# Patient Record
Sex: FEMALE | Race: WHITE | URBAN - METROPOLITAN AREA
[De-identification: names, ages, dates, MRNs, and addresses within clinical notes are randomized per-mention and may not be internally consistent; named-entity substitution may affect disease eponyms.]

---

## 2018-03-01 ENCOUNTER — RADIANT APPOINTMENT (OUTPATIENT)
Dept: GENERAL RADIOLOGY | Facility: CLINIC | Age: 48
End: 2018-03-01
Attending: ORTHOPAEDIC SURGERY

## 2018-03-01 ENCOUNTER — OFFICE VISIT (OUTPATIENT)
Dept: ORTHOPEDICS | Facility: CLINIC | Age: 48
End: 2018-03-01

## 2018-03-01 ENCOUNTER — THERAPY VISIT (OUTPATIENT)
Dept: OCCUPATIONAL THERAPY | Facility: CLINIC | Age: 48
End: 2018-03-01
Payer: COMMERCIAL

## 2018-03-01 ENCOUNTER — TELEPHONE (OUTPATIENT)
Dept: ORTHOPEDICS | Facility: CLINIC | Age: 48
End: 2018-03-01

## 2018-03-01 VITALS — BODY MASS INDEX: 20.73 KG/M2 | HEIGHT: 66 IN | WEIGHT: 129 LBS

## 2018-03-01 DIAGNOSIS — M79.644 PAIN OF RIGHT THUMB: Primary | ICD-10-CM

## 2018-03-01 DIAGNOSIS — S62.502A CLOSED FRACTURE OF LEFT THUMB: ICD-10-CM

## 2018-03-01 DIAGNOSIS — S62.502A CLOSED FRACTURE OF LEFT THUMB: Primary | ICD-10-CM

## 2018-03-01 DIAGNOSIS — S62.501D CLOSED AVULSION FRACTURE OF RIGHT THUMB WITH ROUTINE HEALING, SUBSEQUENT ENCOUNTER: ICD-10-CM

## 2018-03-01 DIAGNOSIS — S62.515A CLOSED NONDISPLACED FRACTURE OF PROXIMAL PHALANX OF LEFT THUMB, INITIAL ENCOUNTER: Primary | ICD-10-CM

## 2018-03-01 PROCEDURE — 97760 ORTHOTIC MGMT&TRAING 1ST ENC: CPT | Mod: GO | Performed by: OCCUPATIONAL THERAPIST

## 2018-03-01 RX ORDER — ZOLPIDEM TARTRATE 5 MG/1
5 TABLET ORAL
COMMUNITY
Start: 2018-02-01

## 2018-03-01 RX ORDER — POLYETHYLENE GLYCOL 3350 17 G/17G
17 POWDER, FOR SOLUTION ORAL
COMMUNITY
Start: 2012-11-12

## 2018-03-01 RX ORDER — ALBUTEROL SULFATE 90 UG/1
2 AEROSOL, METERED RESPIRATORY (INHALATION)
COMMUNITY
Start: 2018-01-31

## 2018-03-01 RX ORDER — LORATADINE 10 MG/1
10 TABLET ORAL
COMMUNITY
Start: 2018-01-31

## 2018-03-01 NOTE — MR AVS SNAPSHOT
"              After Visit Summary   3/1/2018    Aishwarya Parson    MRN: [id number]           Patient Information     Date Of Birth          1970        Visit Information        Provider Department      3/1/2018 3:00 PM Alison Woodall MD [facility]        Today's Diagnoses     Closed nondisplaced fracture of proximal phalanx of left thumb, initial encounter    -  1       Follow-ups after your visit        Additional Services     HAND THERAPY       Hand Therapy Referral  Thumb based splint, with thumb in adduction. To protect the UCL ligament.                  Who to contact     Please call your clinic at [phone] to:    Ask questions about your health    Make or cancel appointments    Discuss your medicines    Learn about your test results    Speak to your doctor            Additional Information About Your Visit        MyChart Information     [employer] is an electronic gateway that provides easy, online access to your medical records. With [employer], you can request a clinic appointment, read your test results, renew a prescription or communicate with your care team.     To sign up for [employer] visit the website at [email or web address]   You will be asked to enter the access code listed below, as well as some personal information. Please follow the directions to create your username and password.     Your access code is: [email or web address]  Expires: 2018  5:02 PM     Your access code will  in 90 days. If you need help or a new code, please contact your [facility] or call [phone] for assistance.        Care EveryWhere ID     This is your Care EveryWhere ID. This could be used by other organizations to access your [place] medical records  [id number]        Your Vitals Were     Height BMI (Body Mass Index)                1.676 m (5' 6\") 20.82 kg/m2           Blood Pressure from Last 3 Encounters:   No data found for BP    Weight from Last 3 " Encounters:   03/01/18 58.5 kg (129 lb)              We Performed the Following     HAND THERAPY        Primary Care Provider    None Specified       No primary provider on file.        Equal Access to Services     JENELLE GONGORA : Hadii aad ku hademiliooskar Ruiz, lisandraankit cliftonroelha, beth albaroannie covarrubias, christian erwin enead gold labrittnykim perry. So [facility] [phone].    ATENCIÓN: Si habla español, tiene a scanlon disposición servicios gratuitos de asistencia lingüística. Llame al [phone].    We comply with applicable federal civil rights laws and [place] laws. We do not discriminate on the basis of race, color, national origin, age, disability, sex, sexual orientation, or gender identity.            Thank you!     Thank you for choosing [facility]  for your care. Our goal is always to provide you with excellent care. Hearing back from our patients is one way we can continue to improve our services. Please take a few minutes to complete the written survey that you may receive in the mail after your visit with us. Thank you!             Your Updated Medication List - Protect others around you: Learn how to safely use, store and throw away your medicines at www.disposemymeds.org.          This list is accurate as of 3/1/18 11:59 PM.  Always use your most recent med list.                   Brand Name Dispense Instructions for use Diagnosis    albuterol 108 (90 BASE) MCG/ACT Inhaler    PROAIR HFA/PROVENTIL HFA/VENTOLIN HFA     Inhale 2 puffs into the lungs        beclomethasone 40 MCG/ACT Inhaler    QVAR     Inhale 1-2 puffs into the lungs        loratadine 10 MG tablet    CLARITIN     Take 10 mg by mouth        mupirocin 2 % nasal ointment    BACTROBAN     Apply BID prn        omeprazole 20 MG CR capsule    priLOSEC     Take 20 mg by mouth        polyethylene glycol Packet    MIRALAX/GLYCOLAX     Take 17 g by mouth        zolpidem 5 MG tablet    AMBIEN     Take 5 mg by mouth

## 2018-03-01 NOTE — LETTER
Date:March 6, 2018      Patient was self referred, no letter generated. Do not send.        [facility] Information

## 2018-03-01 NOTE — LETTER
3/1/2018       RE: Aishwarya Parson  [address] [place] [address]     Dear Colleague,    Thank you for referring your patient, Aishwarya Parson, to the [facility] at [facility]. Please see a copy of my visit note below.    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  Has had pain, swelling, and tenderness.    PE:  Tenderness and echymosis at UCL.  Stable to varus and valgus stress.    Xray:  Minimally displaced UCL avulsion fracture    IMP:  Stable fx    PLAN:  Splint immobilization full time for one month.  RTC for repeat xray left thumb.      Again, thank you for allowing me to participate in the care of your patient.      Sincerely,    Alison Woodall MD

## 2018-03-01 NOTE — TELEPHONE ENCOUNTER
Spoke with Dr. Woodall's nurse at [facility] regarding getting the patient in to clinic today to see us for her Left thumb fracture.  Appointment made for today at 3pm, patient to get x-rays prior per Dr. Woodall.

## 2018-03-01 NOTE — MR AVS SNAPSHOT
"              After Visit Summary   3/1/2018    Aishwarya Parson    MRN: [id number]           Patient Information     Date Of Birth          1970        Visit Information        Provider Department      3/1/2018 3:30 PM Michelle Hendrix OT [facility]        Today's Diagnoses     Pain of right thumb    -  1    Closed avulsion fracture of right thumb with routine healing, subsequent encounter           Follow-ups after your visit        Who to contact     If you have questions or need follow up information about today's clinic visit or your schedule please contact [facility] directly at [phone].  Normal or non-critical lab and imaging results will be communicated to you by "[employer]hart, letter or phone within 4 business days after the clinic has received the results. If you do not hear from us within 7 days, please contact the clinic through "[employer]hart or phone. If you have a critical or abnormal lab result, we will notify you by phone as soon as possible.  Submit refill requests through [employer] or call your pharmacy and they will forward the refill request to us. Please allow 3 business days for your refill to be completed.          Additional Information About Your Visit        MyChart Information     [employer] lets you send messages to your doctor, view your test results, renew your prescriptions, schedule appointments and more. To sign up, go to [email or web address] . Click on \"Log in\" on the left side of the screen, which will take you to the Welcome page. Then click on \"Sign up Now\" on the right side of the page.     You will be asked to enter the access code listed below, as well as some personal information. Please follow the directions to create your username and password.     Your access code is: [email or web address]  Expires: 2018  5:02 PM     Your access code will  in 90 days. If you need help or a new code, please call your [place] clinic or [phone].        Care EveryWhere " ID     This is your Care EveryWhere ID. This could be used by other organizations to access your [place] medical records  [id number]         Blood Pressure from Last 3 Encounters:   No data found for BP    Weight from Last 3 Encounters:   03/01/18 58.5 kg (129 lb)              We Performed the Following     ORTHOTIC MGMT AND TRAINING, EACH 15 MIN        Primary Care Provider    None Specified       No primary provider on file.        Equal Access to Services     [facility]: Hadii aad ku hadasho Soomaali, waaxda luqadaha, qaybta kaalmada adeegyada, waxay jpin hayaan adead jessiemax kuhn . So [facility] [phone].    ATENCIÓN: Si habla español, tiene a scanlon disposición servicios gratuitos de asistencia lingüística. Llame al [phone].    We comply with applicable federal civil rights laws and [place] laws. We do not discriminate on the basis of race, color, national origin, age, disability, sex, sexual orientation, or gender identity.            Thank you!     Thank you for choosing [facility]  for your care. Our goal is always to provide you with excellent care. Hearing back from our patients is one way we can continue to improve our services. Please take a few minutes to complete the written survey that you may receive in the mail after your visit with us. Thank you!             Your Updated Medication List - Protect others around you: Learn how to safely use, store and throw away your medicines at www.disposemymeds.org.          This list is accurate as of 3/1/18  5:02 PM.  Always use your most recent med list.                   Brand Name Dispense Instructions for use Diagnosis    albuterol 108 (90 BASE) MCG/ACT Inhaler    PROAIR HFA/PROVENTIL HFA/VENTOLIN HFA     Inhale 2 puffs into the lungs        beclomethasone 40 MCG/ACT Inhaler    QVAR     Inhale 1-2 puffs into the lungs        loratadine 10 MG tablet    CLARITIN     Take 10 mg by mouth        mupirocin 2 % nasal ointment    BACTROBAN      Apply BID prn        omeprazole 20 MG CR capsule    priLOSEC     Take 20 mg by mouth        polyethylene glycol Packet    MIRALAX/GLYCOLAX     Take 17 g by mouth        zolpidem 5 MG tablet    AMBIEN     Take 5 mg by mouth

## 2018-03-01 NOTE — PROGRESS NOTES
Hand Therapy Initial Evaluation    Current Date:  3/1/2018    Diagnosis:   right thumb UCL avulsion fx   DOI:  2/27/18    Post:  0w 2d  Referring MD:Alison Woodall MD   Next MD visit: PRN will see on rounds at another hospital    Initial Subjective:  Aishwarya Parson is a 47 year old right hand dominant female.    Patient reports symptoms of pain, stiffness/loss of motion, weakness/loss of strength, edema and structure at risk of injury of the right thumb which occurred due to skiing accident. Since onset symptoms are Gradually getting better..  Special tests:  x-ray.  Previous treatment: exos orthosis.    General health as reported by patient is excellent.  Pertinent medical history includes:none  Medical allergies:none.   Surgical history: none  Medication history: anti-inflammatory.    Occupational Profile Information:  Current occupation is Physician [employer]  Currently working in normal job with restrictions  Job Tasks: prolonged sitting, prolonged standing, lifting/carrying, pushing/pulling, repetitive tasks, computer work  Prior functional level:  no limitations  Barriers include:none  Mobility: No difficulty  Transportation: drives  Leisure activities/hobbies: skiing, active person  Other:     Functional Outcome Measure:   See flowsheet    Objective:    PAIN 3/1/2018     Location Right  thumb     Description tender       Radiates no     Worse d/n daytime     Frequency activity dependant     Exacerbated by movement     Relieves rest     At rest 0-10/10 0/10     On use 0-10/10 1/10     At worst.0-10/10 2/10     Sleep disruption?   no     Progression Gradually getting better.           ROM:  NONE measured due to protection of injury  Edema: mild of affected part  Sensation: [x]       WNL throughout all nerve distributions; per patient report    []       Decreased in []        Median   []        Ulnar  []       Radial nerve  []        Dorsal Radial Sensory Nerve distributions; per patient report        Assessment:    Patient presents with symptoms consistent with above diagnosis,  with non-surgical intervention.     Patient's limitations or Problem List includes:  Pain, Decreased ROM/motion, Increased edema and structure at risk of injury of the right thumb which interferes with the patient's ability to perform Self Care Tasks (dressing, eating, bathing, hygiene/toileting), Work Tasks, Recreational Activities and Household Chores as compared to previous level of function.    Rehab Potential:  Excellent - Return to full activity, no limitations    Patient will benefit from skilled Occupational Therapy to increase ROM, flexibility, motion and stability of thumb and decrease pain and edema to return to previous activity level and resume normal daily tasks and to reach their rehab potential.    Barriers to Learning:  No barrier    Communication Issues:  Patient appears to be able to clearly communicate and understand verbal and written communication and follow directions correctly.      Chart Review: Chart Review, Brief history including review of medical and/or therapy records relating to the presenting problem and Simple history review with patient    Identified Performance Deficits: bathing/showering, feeding, care of others, home establishment and management, meal preparation and cleanup, work and leisure activities    Assessment of Occupational Performance:  5 or more Performance Deficits    Clinical Decision Making (Complexity): Low complexity      Treatment Explanation:  The following has been discussed with the patient:  RX ordered/plan of care  Anticipated outcomes  Possible risks and side effects        Treatment Plan:   Frequency:  1 x visit  Duration:  NA; 1 x visit       Orthotic Fabrication: Static hand based thumb spica orthosis, IP included      Discharge Plan:  Achieve all LTG.  Independent in home treatment program.  Reach maximal therapeutic benefit.    Home Exercise Program:  Wear full time and follow up with  Dr. Woodall for further orders for motion at 4 to 6 weeks

## 2018-03-05 NOTE — PROGRESS NOTES
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  Has had pain, swelling, and tenderness.    PE:  Tenderness and echymosis at UCL.  Stable to varus and valgus stress.    Xray:  Minimally displaced UCL avulsion fracture    IMP:  Stable fx    PLAN:  Splint immobilization full time for one month.  RTC for repeat xray left thumb.

## 2018-03-23 ENCOUNTER — TELEPHONE (OUTPATIENT)
Dept: ORTHOPEDICS | Facility: CLINIC | Age: 48
End: 2018-03-23

## 2018-03-23 NOTE — TELEPHONE ENCOUNTER
Returned call to patient regarding scheduling a follow up appointment with Dr. Woodall for next Thursday. Patient is s/p Left thumb minimally displaced UCL avulsion fracture. Per Dr. Woodall's clinic dictation on 3/1/18 patient is to follow up in 1 month in clinic. Appointment made for next Thursday 3/29/18 at 8:00am. Patient agreeable with appointment date and time.

## 2018-03-26 DIAGNOSIS — S62.502A: Primary | ICD-10-CM

## 2018-03-29 ENCOUNTER — RADIANT APPOINTMENT (OUTPATIENT)
Dept: GENERAL RADIOLOGY | Facility: CLINIC | Age: 48
End: 2018-03-29
Attending: ORTHOPAEDIC SURGERY

## 2018-03-29 ENCOUNTER — OFFICE VISIT (OUTPATIENT)
Dept: ORTHOPEDICS | Facility: CLINIC | Age: 48
End: 2018-03-29

## 2018-03-29 DIAGNOSIS — S62.502A: ICD-10-CM

## 2018-03-29 DIAGNOSIS — S62.515D CLOSED NONDISPLACED FRACTURE OF PROXIMAL PHALANX OF LEFT THUMB WITH ROUTINE HEALING, SUBSEQUENT ENCOUNTER: Primary | ICD-10-CM

## 2018-03-29 NOTE — NURSING NOTE
Chief Complaint   Patient presents with     RECHECK     1 month F/u Left Thumb Avulsion FX with new XR.        47 year old  1970         Pain Assessment  Patient Currently in Pain: Yes  0-10 Pain Scale: 1  Alleviating Factors:  (Brace)  Aggravating Factors:  (Not wearing the brace)          [id number] - [place], [place] - [address] [place] [address] [address] [facility]    No Known Allergies  Current Outpatient Prescriptions   Medication     albuterol (PROAIR HFA/PROVENTIL HFA/VENTOLIN HFA) 108 (90 BASE) MCG/ACT Inhaler     beclomethasone (QVAR) 40 MCG/ACT Inhaler     loratadine (CLARITIN) 10 MG tablet     mupirocin (BACTROBAN) 2 % nasal ointment     polyethylene glycol (MIRALAX/GLYCOLAX) Packet     omeprazole (PRILOSEC) 20 MG CR capsule     zolpidem (AMBIEN) 5 MG tablet     No current facility-administered medications for this visit.

## 2018-03-29 NOTE — LETTER
3/29/2018       RE: Aishwarya Parson  [address] [place] [address]     Dear Colleague,    Thank you for referring your patient, Aishwarya Parson, to the [facility] at [facility]. Please see a copy of my visit note below.    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  She was seen with x-rays taken and I had treated her with a full-time thumb spica hand-based brace.  She has been compliant.  She reports it is feeling better but still somewhat painful.      PHYSICAL EXAMINATION:  On examination today, her brace has been removed. She is nontender at the fracture site.  She is tender with range of motion of the thumb as expected, particularly the volar plate when brought into extension.  Distal sensory motor circulation is full.  She is stiff as expected.      IMAGING:  X-rays are taken today and show a healed ulnar collateral ligament avulsion fracture of the right thumb proximal phalanx.      IMPRESSION:  Healed fracture.      At this time, we will advance her to wearing her brace for protection only.  She was given putty to start with strengthening.  Her pinch strength on her affected side is 5 pounds and her unaffected side is 15 pounds.  She will be working on pinch strength and I will see her on an informal basis after today.     Sincerely,    Alison Woodall MD

## 2018-03-29 NOTE — PROGRESS NOTES
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  She was seen with x-rays taken and I had treated her with a full-time thumb spica hand-based brace.  She has been compliant.  She reports it is feeling better but still somewhat painful.      PHYSICAL EXAMINATION:  On examination today, her brace has been removed. She is nontender at the fracture site.  She is tender with range of motion of the thumb as expected, particularly the volar plate when brought into extension.  Distal sensory motor circulation is full.  She is stiff as expected.      IMAGING:  X-rays are taken today and show a healed ulnar collateral ligament avulsion fracture of the right thumb proximal phalanx.      IMPRESSION:  Healed fracture.      At this time, we will advance her to wearing her brace for protection only.  She was given putty to start with strengthening.  Her pinch strength on her affected side is 5 pounds and her unaffected side is 15 pounds.  She will be working on pinch strength and I will see her on an informal basis after today.

## 2018-12-19 ENCOUNTER — OFFICE VISIT (OUTPATIENT)
Dept: OPHTHALMOLOGY | Facility: CLINIC | Age: 48
End: 2018-12-19
Attending: OPHTHALMOLOGY
Payer: COMMERCIAL

## 2018-12-19 DIAGNOSIS — Z98.890 HX OF LASIK: Primary | ICD-10-CM

## 2018-12-19 DIAGNOSIS — H52.4 PRESBYOPIA: ICD-10-CM

## 2018-12-19 DIAGNOSIS — H52.13 MYOPIA OF BOTH EYES: ICD-10-CM

## 2018-12-19 PROCEDURE — 76514 ECHO EXAM OF EYE THICKNESS: CPT | Mod: ZF | Performed by: OPHTHALMOLOGY

## 2018-12-19 PROCEDURE — G0463 HOSPITAL OUTPT CLINIC VISIT: HCPCS | Mod: ZF

## 2018-12-19 PROCEDURE — 92025 CPTRIZED CORNEAL TOPOGRAPHY: CPT | Mod: ZF | Performed by: OPHTHALMOLOGY

## 2018-12-19 PROCEDURE — 92015 DETERMINE REFRACTIVE STATE: CPT | Mod: ZF

## 2018-12-19 ASSESSMENT — PACHYMETRY
OD_CT(UM): 480
OS_CT(UM): 435

## 2018-12-19 ASSESSMENT — CUP TO DISC RATIO
OS_RATIO: 0.15
OD_RATIO: 0.2

## 2018-12-19 ASSESSMENT — TONOMETRY
OD_IOP_MMHG: 15
OS_IOP_MMHG: 14
IOP_METHOD: TONOPEN

## 2018-12-19 ASSESSMENT — EXTERNAL EXAM - LEFT EYE: OS_EXAM: NORMAL

## 2018-12-19 ASSESSMENT — REFRACTION_MANIFEST
OS_SPHERE: -0.50
OS_CYLINDER: +0.00
OD_SPHERE: -1.00
OS_AXIS: 000
OD_CYLINDER: +1.00
OD_ADD: +1.50
OD_AXIS: 105
OS_ADD: +1.50

## 2018-12-19 ASSESSMENT — VISUAL ACUITY
OS_SC: 20/20
OD_SC: 20/30
OD_SC+: -2
METHOD: SNELLEN - LINEAR

## 2018-12-19 ASSESSMENT — CONF VISUAL FIELD
OD_NORMAL: 1
OS_NORMAL: 1

## 2018-12-19 ASSESSMENT — EXTERNAL EXAM - RIGHT EYE: OD_EXAM: NORMAL

## 2018-12-19 ASSESSMENT — SLIT LAMP EXAM - LIDS
COMMENTS: NORMAL
COMMENTS: NORMAL

## 2018-12-19 NOTE — PROGRESS NOTES
CC -   Annual exam    INTERVAL HISTORY - Initial visit    HPI -   ALFRED Parson is a  48 year old year-old patient with history of LASIK (2008, here with Ch) who comes for a comprehensive eye exam. Reports vision has been slightly blurry and reports mild difficulty with near vision. Patient denies any flashes, floaters, pain, redness, discharge, diplopia, or photophobia.    PAST OCULAR SURGERY  None    IMAGING: -   None    ASSESSMENT & PLAN  Myopia with astigmatism and presbyopia   Refracts to 20/20     History of LASIK   Doing well, monitor   Recommend artificial tears, warm compresses for dry eyes   Recommend baseline pachymetry and K mc both eyes to monitor for ectasia    Return to clinic 1 year for annual exam or sooner if needed    Ruddy Molina MD  PGY-3 Ophthalmology Resident  [phone]    Attending Physician Attestation:  Complete documentation of historical and exam elements from today's encounter can be found in the full encounter summary report (not reduplicated in this progress note).  I personally obtained the chief complaint(s) and history of present illness.  I confirmed and edited as necessary the review of systems, past medical/surgical history, family history, social history, and examination findings as documented by others; and I examined the patient myself.  I personally reviewed the relevant tests, images, and reports as documented above.  I formulated and edited as necessary the assessment and plan and discussed the findings and management plan with the patient and family. - Jhonathan Murrieta MD    --------------------------------------------------------------------------------------------------------------------------------------------  Pachymetry - Interpretation & Report  Indication: s/p LASIK both eyes  Performed by: Jhonathan Murrieta MD  Reliability: good  Patient cooperation: good  Findings:   Right eye:  480 micrometers centrally    Left eye:  435 micrometers centrally   Interval  Change, Assessment, & Impact on treatment:   Right eye:  Thin, new baseline to monitor for ectasia   Left eye:  Thin, new baseline to monitor for ectasia   Signed: Jhonathan Murrieta 12/19/2018 8:57 AM

## 2024-10-28 ENCOUNTER — OFFICE VISIT (OUTPATIENT)
Dept: PLASTIC SURGERY | Facility: CLINIC | Age: 54
End: 2024-10-28

## 2024-10-28 DIAGNOSIS — Z41.1 ENCOUNTER FOR COSMETIC PROCEDURE: Primary | ICD-10-CM

## 2024-10-28 DIAGNOSIS — L70.9 ACNE: Primary | ICD-10-CM

## 2024-10-28 RX ORDER — TRETINOIN 0.25 MG/G
CREAM TOPICAL AT BEDTIME
Qty: 20 G | Refills: 4 | Status: SHIPPED | OUTPATIENT
Start: 2024-10-28

## 2024-10-28 RX ORDER — TRETINOIN 0.25 MG/G
CREAM TOPICAL AT BEDTIME
Qty: 20 G | Refills: 4 | Status: SHIPPED | OUTPATIENT
Start: 2024-10-28 | End: 2024-10-28

## 2024-10-28 NOTE — NURSING NOTE
Photo documentation obtained.    Gave pt a cosmetic quote for TCA chemical peel, botox and filler (see Media tab).    Discussed quote/GFE in detail with pt, including the fact that anesthesia and facility fees are an estimate based on time and may be subject to change. We also discussed that a non-refundable deposit of 50% of the surgeon's fee is collected at the time of scheduling, with the remaining surgeon's fee due three weeks prior to surgery.    Pt verbalized understanding of financial responsibility if she decides to pursue cosmetic surgery.    Prescription was written for tretinoin cream.     Negar Adkins RN  10/28/24 11:06 AM

## 2024-10-28 NOTE — PROGRESS NOTES
Facial Plastic and Reconstructive Surgery Cosmetic Consultation  10/27/24     Referring Provider:   Lucia Bojorquez MD  [address] [address]    HPI:   I had the pleasure of seeing ALFRED Parson today in clinic for consultation for surgical facial rejuvenation.    ALFRED Parson is a 54 year old female. She is bothered by her jowling, her neck, and lateral perioral rhytids. She has done upper face botox for many years (but has not recently). She has tried botox around the mouth but didn't like it (she couldn't even brush her teeth). She has had superficial peels once with an . She has never had filler. She has never had any surgery on her face. She does not use a retinol.     She is a PM&R physician at [employer]     PMHx: Asthma  PSHx: Foot neuroma excision, foot foreign body removal, breast augmentation, lasik  Meds: Zyrtec  Allergies: NKDA  Smoking/vaping: never.      PE:  Alert and Oriented, Answering Questions Appropriately  Atraumatic, Normocephalic, Face Symmetric  Skin: Burch 2  Facial Nerve Intact and facial movement symmetric  She has early midfacial descent with early jowling and deepening of prejowl sulcus. She has some deepening and static perioral rhytids. She has some early platysmal banding and skin laxity. She has mild blunting of her cervicomental angle. She has thin skin and some sun spots.           IMPRESSION/PLAN: ALFRED Parson is a 54 year old female here today in consult for surgical facial rejuvenation. We discussed all options including conservative things like botox and filler, chemical peels, using retinol, as well as a deep plane lower face and neck lift. I do not think she is ready for a face and neck lift based on her aging changes I see. We discussed voluma to the prejowl sulcus and volbella to her superficial perioral rhytids as well as starting tretinoin (I will write her a prescription for that) and a full face chemical peel. We  gave her a quote today for voluma x 1, volbella x 1, 35u of Botox, and a full face 30% TCA chemical peel. We gave her post procedure instructions as well. Risks and benefits of the procedure were discussed, including but not limited to motor/sensory nerve damage (temporary and permanent), scarring, hematoma, irregularities of skin and underlying soft tissue, infection, asymmetry, vascular occlusion, hyper/hypopigmentation, and the need for additional procedures.     Photodocumentation was obtained.

## 2024-12-05 DIAGNOSIS — B00.9 HERPES SIMPLEX VIRUS INFECTION: Primary | ICD-10-CM

## 2024-12-05 RX ORDER — VALACYCLOVIR HYDROCHLORIDE 1 G/1
1000 TABLET, FILM COATED ORAL 2 TIMES DAILY
Qty: 14 TABLET | Refills: 0 | Status: SHIPPED | OUTPATIENT
Start: 2024-12-05 | End: 2024-12-12

## 2024-12-09 ENCOUNTER — OFFICE VISIT (OUTPATIENT)
Dept: PLASTIC SURGERY | Facility: CLINIC | Age: 54
End: 2024-12-09

## 2024-12-09 DIAGNOSIS — Z41.1 ENCOUNTER FOR COSMETIC PROCEDURE: Primary | ICD-10-CM

## 2024-12-09 NOTE — PROGRESS NOTES
Facial Plastic and Reconstructive Surgery      ALFRED POWERS Eb is back today to discuss chemical peel. WE again discussed plan. She should stop her tretinoin. All questions were answered and after care was given.     Edith Christine MD

## 2025-01-20 NOTE — PROGRESS NOTES
Facial Plastic and Reconstructive Surgery      ALFRED Parson is about 1 month s/p full face chemical peel and filler on 12/20/24.     Today, she reports she is doing well. Happy with results. Some areas that are red that started after she restarted her retinol.     On exam, she looks great and her skin looks incredible. We discussed holding off on retinol in the areas that are red.     A/P: She is now 1 month s/p chemical peel and filler.     She can follow up as needed and certainly if any new concerns arise.     We will touch base with her in 2-3 weeks to make sure the redness is fading. If it is not, we will start some topical treatments.     Edith Christine MD

## 2025-01-21 ENCOUNTER — OFFICE VISIT (OUTPATIENT)
Dept: PLASTIC SURGERY | Facility: CLINIC | Age: 55
End: 2025-01-21

## 2025-01-21 DIAGNOSIS — Z41.1 ENCOUNTER FOR COSMETIC PROCEDURE: Primary | ICD-10-CM

## 2025-01-22 NOTE — NURSING NOTE
Photo documentation obtained.     Will follow-up with pt in 3 weeks to see how erythremic areas are healing.     Negar Adkins RN  01/22/25 2:20 PM